# Patient Record
Sex: MALE | Race: OTHER | NOT HISPANIC OR LATINO | URBAN - METROPOLITAN AREA
[De-identification: names, ages, dates, MRNs, and addresses within clinical notes are randomized per-mention and may not be internally consistent; named-entity substitution may affect disease eponyms.]

---

## 2017-02-24 ENCOUNTER — EMERGENCY (EMERGENCY)
Facility: HOSPITAL | Age: 47
LOS: 0 days | Discharge: HOME | End: 2017-02-25
Attending: EMERGENCY MEDICINE
Payer: OTHER MISCELLANEOUS

## 2017-02-24 VITALS
OXYGEN SATURATION: 99 % | HEIGHT: 71 IN | DIASTOLIC BLOOD PRESSURE: 117 MMHG | TEMPERATURE: 98 F | RESPIRATION RATE: 18 BRPM | WEIGHT: 207.9 LBS | SYSTOLIC BLOOD PRESSURE: 185 MMHG | HEART RATE: 77 BPM
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
## 2017-02-24 DIAGNOSIS — H10.213 ACUTE TOXIC CONJUNCTIVITIS, BILATERAL: ICD-10-CM
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
## 2017-02-24 PROCEDURE — 99282 EMERGENCY DEPT VISIT SF MDM: CPT

## 2017-02-24 NOTE — ED ADULT TRIAGE NOTE - CHIEF COMPLAINT QUOTE
c/o b/l eye irritation r worse than l with redness and irritation since 1600, states possible foreign body, occurred while at work seen at clinic at work with eye flush administered, slight improvement

## 2017-02-25 VITALS
DIASTOLIC BLOOD PRESSURE: 85 MMHG | TEMPERATURE: 98 F | OXYGEN SATURATION: 100 % | SYSTOLIC BLOOD PRESSURE: 156 MMHG | HEART RATE: 82 BPM | RESPIRATION RATE: 16 BRPM

## 2017-02-25 NOTE — ED ADULT NURSE NOTE - OBJECTIVE STATEMENT
Patient reports bilateral redness, burning, and tearing since this afternoon. Possible foreign body into eyes at work. Bilateral conjunctiva red. No obvious foreign body noted.  Patient did eye rinse this afternoon. Patient reports bilateral redness, burning, and tearing since this afternoon. Possible foreign body into eyes at work. Bilateral conjunctiva red. No obvious foreign body noted.  Patient did eye rinse this afternoon around 4pm at time of incident. Denies Blurry vision. Visual acuity: Left 20/20 Right 20/20 both 20/20

## 2017-02-25 NOTE — ED PROVIDER NOTE - MEDICAL DECISION MAKING DETAILS
Each eye irrigated with 500mL of NS. Patient reports feeling well and is ready to go home. He has been provided with eye lubricant. Patient is to follow up with ophthalmologist tomorrow.

## 2017-02-25 NOTE — ED PROVIDER NOTE - OBJECTIVE STATEMENT
Pertinent PMH/PSH/FHx/SHx and Review of Systems contained within:  47 yo m with no PMH presents in ED c/o bilateral eye lacrimation and burning s/p scratching eyes after touching unknown liquid. Incident occurred at 4pm and patient reports irrigating eyes after. No aggravating or relieving factors, No fever/chills, No photophobia/eye pain/changes in vision, No ear pain/sore throat/dysphagia, No chest pain/palpitations, no SOB/cough/wheeze/stridor, No abdominal pain, No N/V/D, no dysuria/frequency/discharge, No neck/back pain, no rash, no changes in neurological status/function.

## 2017-02-25 NOTE — ED ADULT NURSE REASSESSMENT NOTE - NS ED NURSE REASSESS COMMENT FT1
Bilateral cl lens placed and both eye irrigated with 500ml of Normal saline. Patient tolerated well. Patient reports relief of burning after irriagtion.

## 2017-02-28 ENCOUNTER — APPOINTMENT (OUTPATIENT)
Dept: OPHTHALMOLOGY | Facility: CLINIC | Age: 47
End: 2017-02-28

## 2017-02-28 DIAGNOSIS — T26.62XA: ICD-10-CM

## 2017-02-28 PROBLEM — Z00.00 ENCOUNTER FOR PREVENTIVE HEALTH EXAMINATION: Status: ACTIVE | Noted: 2017-02-28

## 2017-03-28 ENCOUNTER — APPOINTMENT (OUTPATIENT)
Dept: OPHTHALMOLOGY | Facility: CLINIC | Age: 47
End: 2017-03-28

## 2017-07-23 NOTE — ED ADULT NURSE NOTE - CAS EDN DISCHARGE ASSESSMENT
Chronic renal failure  On hemodialysis T, Th, Sat  History of CVA (cerebrovascular accident)  Left sided weakness, dysphagia, PEG in place  Hypotension    Murmur    Osteoporosis
Alert and oriented to person, place and time

## 2020-09-30 ENCOUNTER — TRANSCRIPTION ENCOUNTER (OUTPATIENT)
Age: 50
End: 2020-09-30

## 2020-09-30 ENCOUNTER — INPATIENT (INPATIENT)
Facility: HOSPITAL | Age: 50
LOS: 0 days | Discharge: HOME | DRG: 494 | End: 2020-10-01
Attending: ORTHOPAEDIC SURGERY | Admitting: ORTHOPAEDIC SURGERY
Payer: COMMERCIAL

## 2020-09-30 VITALS
SYSTOLIC BLOOD PRESSURE: 178 MMHG | TEMPERATURE: 98 F | DIASTOLIC BLOOD PRESSURE: 90 MMHG | OXYGEN SATURATION: 98 % | WEIGHT: 203.93 LBS | HEART RATE: 74 BPM | RESPIRATION RATE: 18 BRPM | HEIGHT: 71 IN

## 2020-09-30 DIAGNOSIS — I10 ESSENTIAL (PRIMARY) HYPERTENSION: ICD-10-CM

## 2020-09-30 DIAGNOSIS — S93.05XA DISLOCATION OF LEFT ANKLE JOINT, INITIAL ENCOUNTER: ICD-10-CM

## 2020-09-30 DIAGNOSIS — R94.31 ABNORMAL ELECTROCARDIOGRAM [ECG] [EKG]: ICD-10-CM

## 2020-09-30 LAB
APTT BLD: 19.2 SEC — LOW (ref 27.5–35.5)
BASOPHILS # BLD AUTO: 0.04 K/UL — SIGNIFICANT CHANGE UP (ref 0–0.2)
BASOPHILS NFR BLD AUTO: 0.4 % — SIGNIFICANT CHANGE UP (ref 0–2)
BLD GP AB SCN SERPL QL: NEGATIVE — SIGNIFICANT CHANGE UP
CK SERPL-CCNC: 282 U/L — HIGH (ref 30–200)
EOSINOPHIL # BLD AUTO: 0.31 K/UL — SIGNIFICANT CHANGE UP (ref 0–0.5)
EOSINOPHIL NFR BLD AUTO: 3.4 % — SIGNIFICANT CHANGE UP (ref 0–6)
HCT VFR BLD CALC: 47.8 % — SIGNIFICANT CHANGE UP (ref 39–50)
HGB BLD-MCNC: 16.5 G/DL — SIGNIFICANT CHANGE UP (ref 13–17)
IMM GRANULOCYTES NFR BLD AUTO: 0.7 % — SIGNIFICANT CHANGE UP (ref 0–1.5)
INR BLD: 0.97 RATIO — SIGNIFICANT CHANGE UP (ref 0.88–1.16)
LYMPHOCYTES # BLD AUTO: 2.05 K/UL — SIGNIFICANT CHANGE UP (ref 1–3.3)
LYMPHOCYTES # BLD AUTO: 22.7 % — SIGNIFICANT CHANGE UP (ref 13–44)
MCHC RBC-ENTMCNC: 30.2 PG — SIGNIFICANT CHANGE UP (ref 27–34)
MCHC RBC-ENTMCNC: 34.5 GM/DL — SIGNIFICANT CHANGE UP (ref 32–36)
MCV RBC AUTO: 87.5 FL — SIGNIFICANT CHANGE UP (ref 80–100)
MONOCYTES # BLD AUTO: 0.54 K/UL — SIGNIFICANT CHANGE UP (ref 0–0.9)
MONOCYTES NFR BLD AUTO: 6 % — SIGNIFICANT CHANGE UP (ref 2–14)
NEUTROPHILS # BLD AUTO: 6.02 K/UL — SIGNIFICANT CHANGE UP (ref 1.8–7.4)
NEUTROPHILS NFR BLD AUTO: 66.8 % — SIGNIFICANT CHANGE UP (ref 43–77)
NRBC # BLD: 0 /100 WBCS — SIGNIFICANT CHANGE UP (ref 0–0)
PLATELET # BLD AUTO: 206 K/UL — SIGNIFICANT CHANGE UP (ref 150–400)
PROTHROM AB SERPL-ACNC: 11.7 SEC — SIGNIFICANT CHANGE UP (ref 10.6–13.6)
RBC # BLD: 5.46 M/UL — SIGNIFICANT CHANGE UP (ref 4.2–5.8)
RBC # FLD: 12.1 % — SIGNIFICANT CHANGE UP (ref 10.3–14.5)
RH IG SCN BLD-IMP: POSITIVE — SIGNIFICANT CHANGE UP
SARS-COV-2 RNA SPEC QL NAA+PROBE: SIGNIFICANT CHANGE UP
WBC # BLD: 9.02 K/UL — SIGNIFICANT CHANGE UP (ref 3.8–10.5)
WBC # FLD AUTO: 9.02 K/UL — SIGNIFICANT CHANGE UP (ref 3.8–10.5)

## 2020-09-30 PROCEDURE — 73590 X-RAY EXAM OF LOWER LEG: CPT | Mod: 26,LT

## 2020-09-30 PROCEDURE — 93010 ELECTROCARDIOGRAM REPORT: CPT | Mod: 59

## 2020-09-30 PROCEDURE — 73610 X-RAY EXAM OF ANKLE: CPT | Mod: 26,LT

## 2020-09-30 PROCEDURE — 71045 X-RAY EXAM CHEST 1 VIEW: CPT | Mod: 26

## 2020-09-30 PROCEDURE — 27840 TREAT ANKLE DISLOCATION: CPT | Mod: 54

## 2020-09-30 PROCEDURE — 73610 X-RAY EXAM OF ANKLE: CPT | Mod: 26,LT,77

## 2020-09-30 PROCEDURE — 99291 CRITICAL CARE FIRST HOUR: CPT | Mod: 57

## 2020-09-30 RX ORDER — OXYCODONE HYDROCHLORIDE 5 MG/1
5 TABLET ORAL EVERY 4 HOURS
Refills: 0 | Status: DISCONTINUED | OUTPATIENT
Start: 2020-09-30 | End: 2020-10-01

## 2020-09-30 RX ORDER — MAGNESIUM HYDROXIDE 400 MG/1
30 TABLET, CHEWABLE ORAL DAILY
Refills: 0 | Status: DISCONTINUED | OUTPATIENT
Start: 2020-09-30 | End: 2020-10-01

## 2020-09-30 RX ORDER — ONDANSETRON 8 MG/1
4 TABLET, FILM COATED ORAL ONCE
Refills: 0 | Status: COMPLETED | OUTPATIENT
Start: 2020-09-30 | End: 2020-09-30

## 2020-09-30 RX ORDER — METOCLOPRAMIDE HCL 10 MG
5 TABLET ORAL EVERY 6 HOURS
Refills: 0 | Status: DISCONTINUED | OUTPATIENT
Start: 2020-09-30 | End: 2020-10-01

## 2020-09-30 RX ORDER — SODIUM CHLORIDE 9 MG/ML
1000 INJECTION, SOLUTION INTRAVENOUS
Refills: 0 | Status: DISCONTINUED | OUTPATIENT
Start: 2020-09-30 | End: 2020-10-01

## 2020-09-30 RX ORDER — ACETAMINOPHEN 500 MG
975 TABLET ORAL EVERY 8 HOURS
Refills: 0 | Status: DISCONTINUED | OUTPATIENT
Start: 2020-09-30 | End: 2020-10-01

## 2020-09-30 RX ORDER — MORPHINE SULFATE 50 MG/1
2 CAPSULE, EXTENDED RELEASE ORAL
Refills: 0 | Status: DISCONTINUED | OUTPATIENT
Start: 2020-09-30 | End: 2020-10-01

## 2020-09-30 RX ORDER — SENNA PLUS 8.6 MG/1
2 TABLET ORAL AT BEDTIME
Refills: 0 | Status: DISCONTINUED | OUTPATIENT
Start: 2020-09-30 | End: 2020-10-01

## 2020-09-30 RX ORDER — MORPHINE SULFATE 50 MG/1
4 CAPSULE, EXTENDED RELEASE ORAL ONCE
Refills: 0 | Status: DISCONTINUED | OUTPATIENT
Start: 2020-09-30 | End: 2020-09-30

## 2020-09-30 RX ORDER — OXYCODONE HYDROCHLORIDE 5 MG/1
10 TABLET ORAL EVERY 4 HOURS
Refills: 0 | Status: DISCONTINUED | OUTPATIENT
Start: 2020-09-30 | End: 2020-10-01

## 2020-09-30 RX ADMIN — MORPHINE SULFATE 4 MILLIGRAM(S): 50 CAPSULE, EXTENDED RELEASE ORAL at 20:29

## 2020-09-30 RX ADMIN — MORPHINE SULFATE 4 MILLIGRAM(S): 50 CAPSULE, EXTENDED RELEASE ORAL at 21:30

## 2020-09-30 RX ADMIN — Medication 975 MILLIGRAM(S): at 23:32

## 2020-09-30 NOTE — ED ADULT NURSE REASSESSMENT NOTE - NS ED NURSE REASSESS COMMENT FT1
Pt reports pain relief & currently rates it 3/10, denies wanting further pain intervention at this time. Pt resting comfortably without complaints & does not appear to be in any acute distress at this time with VSS. Will continue to reassess.

## 2020-09-30 NOTE — ED ADULT NURSE NOTE - OBJECTIVE STATEMENT
Patient is a 50y male presenting to the ED via EMS with c/o left ankle injury/pain. Patient A&Ox4. Reports injuring left ankle while playing soccer. States he was dribbling the ball, the ball "gave out" causing his left ankle to praveena. Reports feeling sharp pain right away in the left ankle with radiation to the left lower leg. Upon arrival to [facility] ED, patient displays a left ankle deformity with associated swelling and sharp, 8/10 pain in the left ankle and left lower leg. No palpable DP or PT pulses present in the left lower extremity. Gross sensation intact. Patient is able to move toes of the left foot. Left ankle and left  to palpation. Denies any pain in the left hip, left knee or proximal tibia. PMH HTN.

## 2020-09-30 NOTE — CONSULT NOTE ADULT - SUBJECTIVE AND OBJECTIVE BOX
Patient is a 50y old  Male who presents with a chief complaint of left an kle pain     HPI:     51 y/o male with a h/o HTN, presenting with left ankle pain/swelling s/p injury, which started PTA. Patient states he was playing soccer when he stepped on the ball and everted his ankle. Acute onset, radiating to left lower leg, sharp pain, associated with left ankle deformity. Patient was BIBEMS, no meds given. Denies any head trauma, knee pain, hip pain, or other injuries.  Denies SOB chest pain Nausea or vomiting.    MEDICATIONS  (STANDING):  ondansetron Injectable 4 milliGRAM(s) IV Push once    MEDICATIONS  (PRN):      Allergies    No Known Allergies    Intolerances      PAST MEDICAL HISTORY:  Essential hypertension      PAST SURGICAL HISTORY:  No significant past surgical history        Diet:  (   ) Regular   (  x ) Low Sodium   (   ) Low Cholesterol   (   ) Diabetic   (   ) Other    FAMILY HISTORY:  N/C    SOCIAL HISTORY:    Substance Use: (x  ) never used  (  ) other:  Tobacco Usage:  ( x ) never smoked   (   ) former smoker   (   ) current smoker  (   ) pack years  (   ) last cigarette date  Alcohol Usage: none  Advanced Directives: (   ) None    (   ) DNR    (   ) DNI    (   ) Health Care Proxy:     REVIEW OF SYSTEM:  CONSTITUTIONAL: No fever, No change in weight, No fatigue  HEAD: No headache, No dizziness, No recent trauma  EYES: No eye pain, No visual disturbances, No discharge  ENT:  No difficulty hearing, No tinnitus, No vertigo, No sinus pain, No throat pain  NECK: No pain, No stiffness  BREASTS: No pain, No masses, No nipple discharge  RESPIRATORY: No cough, No wheezing, No chills, No hemoptysis, No shortness of breath at rest or exertional shortness of breath  CARDIOVASCULAR: No chest pain, No palpitations, No dizziness, No CHF, No arrhythmia, No cardiomegaly, No leg swelling  GASTROINTESTINAL: No abdominal, No epigastric pain. No nausea, No vomiting, No hematemesis, No diarrhea, No constipation. No melena, No hematochezia. No GERD  GENITOURINARY: No dysuria, No frequency, No hematuria, No incontinence, No nocturia, No hesitancy,  SKIN: No itching, No burning, No rashes, No lesions   LYMPH NODES: No history of enlarged glands  ENDOCRINE: No heat or cold intolerance, No hair loss. No osteoporosis, No thyroid disease  MUSCULOSKELETAL: No joint pain or swelling, No muscle, back, or extremity pain  (+) painful swollen left ankle  PSYCHIATRIC: No depression, No anxiety, No mood swings, No difficulty sleeping  HEME/LYMPH: No easy bruising, No anticoagulants, No bleeding disorder, No bleeding gums  ALLERGY AND IMMUNOLOGIC: No hives, No eczema  NEUROLOGICAL: No memory loss, No loss of strength, No numbness, No tremors    VITALS:  Vital Signs Last 24 Hrs  T(C): 36.6 (30 Sep 2020 19:50), Max: 36.6 (30 Sep 2020 19:50)  T(F): 97.8 (30 Sep 2020 19:50), Max: 97.8 (30 Sep 2020 19:50)  HR: 74 (30 Sep 2020 19:50) (74 - 74)  BP: 178/90 (30 Sep 2020 19:50) (178/90 - 178/90)  BP(mean): --  RR: 18 (30 Sep 2020 19:50) (18 - 18)  SpO2: 98% (30 Sep 2020 19:50) (98% - 98%)  I&O's Summary      PHYSICAL EXAM:  GENERAL: NAD, well nourished and conversant  HEAD:  Atraumatic  EYES: EOM, PERRLA, conjunctiva pink and sclera white  ENT: No tonsillar erythema, exudates, or enlargement, moist mucous membranes, good dentition, no lesions  NECK: Supple, No JVD, normal thyroid, carotids with normal upstrokes and no bruits  CHEST/LUNG: Clear to auscultation bilaterally, No rales, rhonchi, wheezing, or rubs  HEART: Regular rate and rhythm, No murmurs, rubs, or gallops  ABDOMEN: Soft, nondistended, no masses, guarding, tenderness or rebound, bowel sounds present  EXTREMITIES:  2+ Peripheral Pulses, No clubbing, cyanosis, or edema. No arthritis of shoulders, elbows, hands, hips, knees, ankles, or feet. No DJD C spine, T spine, or L/S spine  (+) swollen left ankle   LYMPH: No lymphadenopathy noted  SKIN: No rashes or lesions  NERVOUS SYSTEM:  Alert & Oriented X3, normal cognitive function. Motor Strength 5/5 right upper and right lower.  5/5 left upper and left lower extremities, DTRs 2+ intact and symmetric    LABS:      nsr no acute changes inverted T waves inferolateral leads      Labs unremarkable          CAPILLARY BLOOD GLUCOSE          RADIOLOGY & ADDITIONAL TESTS:    Consultant(s):    Care Discussed with Consultants/Other Providers [ ] YES  [ ] NO

## 2020-09-30 NOTE — ED PROVIDER NOTE - PROGRESS NOTE DETAILS
Patient seen at bedside immediately with EMS, noted to have obvious L ankle dislocation (closed); diminished DP pulse and sensation to L foot noted.  L foot also noted to be slightly cool relative to R.  Emergent reduction performed (emergent consent implied) using traction/countertraction (see procedure note) without complication, patient tolerated well, noted to have good return of DP pulse and sensation.  Foot warm to touch almost immediately after procedure.  Ortho contacted, recommend imaging, will be at bedside shortly.  --JOANN

## 2020-09-30 NOTE — CONSULT NOTE ADULT - ASSESSMENT
49 y/o male with a h/o HTN, presenting with left ankle pain/swelling s/p injury, which started PTA. Patient states he was playing soccer when he stepped on the ball and everted his ankle. Acute onset, radiating to left lower leg, sharp pain, associated with left ankle deformity. Patient was BIBEMS, no meds given. Denies any head trauma, knee pain, hip pain, or other injuries.  Denies SOB chest pain Nausea or vomiting.

## 2020-09-30 NOTE — ED ADULT NURSE NOTE - NSIMPLEMENTINTERV_GEN_ALL_ED
Implemented All Universal Safety Interventions:  [place] to call system. Call bell, personal items and telephone within reach. Instruct patient to call for assistance. Room bathroom lighting operational. Non-slip footwear when patient is off stretcher. Physically safe environment: no spills, clutter or unnecessary equipment. Stretcher in lowest position, wheels locked, appropriate side rails in place.

## 2020-09-30 NOTE — ED PROVIDER NOTE - PHYSICAL EXAMINATION
Musc:   Left ankle: no palpable DP/PT pulses. Foot slightly cool. Sensation intact. Pt able to moves toes. Obvious lateral deviated deformity of ankle with moderate-severe swelling. Diffuse tenderness to palpation.   Mild tenderness to lateral mid lower leg, but no tenderness over left fibular head. Normal ROM of knee and hip, no tenderness or swelling.

## 2020-09-30 NOTE — ED PROVIDER NOTE - CARE PLAN
Principal Discharge DX:	Ankle dislocation, left, initial encounter   Principal Discharge DX:	Ankle dislocation, left, initial encounter  Secondary Diagnosis:	Left trimalleolar fracture, closed, initial encounter

## 2020-09-30 NOTE — ED PROVIDER NOTE - OBJECTIVE STATEMENT
49 y/o male with a h/o HTN, presenting with left ankle pain/swelling 51 y/o male with a h/o HTN, presenting with left ankle pain/swelling s/p injury, which started PTA. Patient states he was playing soccer when he stepped on the ball and everted his ankle. Acute onset, radiating to left lower leg, sharp pain, associated with left ankle deformity. Patient was BIBEMS, no meds given. Denies any head trauma, knee pain, hip pain, or other injuries.

## 2020-09-30 NOTE — CONSULT NOTE ADULT - PROBLEM SELECTOR RECOMMENDATION 3
Include Location In Plan?: No Detail Level: Simple Patient with no evidence of cardiac issues  Check troponin level .  Consider Echocardiogram if c/o chest pain

## 2020-09-30 NOTE — ED PROVIDER NOTE - CLINICAL SUMMARY MEDICAL DECISION MAKING FREE TEXT BOX
Closed L ankle dislocation with NV compromise, emergently reduced immediately upon arrival to patient care area, noted to have return of full NV exam.  Ortho c/s, preop labs, imaging, pain Rx, likely admit.  --BMM Closed L ankle dislocation with NV compromise, emergently reduced immediately upon arrival to patient care area, noted to have return of full NV exam.  Ortho c/s, preop labs, imaging, pain Rx, likely admit.  --BMM    Reduced ankle dislocation emergently due to NV compromise. Ortho consulted, splint placed by ortho. Pain well-controlled in ED.   Admitted to ortho surgery.

## 2020-10-01 ENCOUNTER — TRANSCRIPTION ENCOUNTER (OUTPATIENT)
Age: 50
End: 2020-10-01

## 2020-10-01 VITALS
OXYGEN SATURATION: 96 % | HEART RATE: 94 BPM | TEMPERATURE: 99 F | SYSTOLIC BLOOD PRESSURE: 143 MMHG | RESPIRATION RATE: 19 BRPM | DIASTOLIC BLOOD PRESSURE: 89 MMHG

## 2020-10-01 DIAGNOSIS — S82.852A DISPLACED TRIMALLEOLAR FRACTURE OF LEFT LOWER LEG, INITIAL ENCOUNTER FOR CLOSED FRACTURE: ICD-10-CM

## 2020-10-01 PROBLEM — I10 ESSENTIAL (PRIMARY) HYPERTENSION: Chronic | Status: ACTIVE | Noted: 2017-02-25

## 2020-10-01 LAB
ANION GAP SERPL CALC-SCNC: 14 MMOL/L — SIGNIFICANT CHANGE UP (ref 5–17)
APTT BLD: 30.3 SEC — SIGNIFICANT CHANGE UP (ref 27.5–35.5)
BUN SERPL-MCNC: 16 MG/DL — SIGNIFICANT CHANGE UP (ref 7–23)
CALCIUM SERPL-MCNC: 10.6 MG/DL — HIGH (ref 8.4–10.5)
CHLORIDE SERPL-SCNC: 98 MMOL/L — SIGNIFICANT CHANGE UP (ref 96–108)
CO2 SERPL-SCNC: 25 MMOL/L — SIGNIFICANT CHANGE UP (ref 22–31)
CREAT SERPL-MCNC: 1.19 MG/DL — SIGNIFICANT CHANGE UP (ref 0.5–1.3)
GLUCOSE SERPL-MCNC: 125 MG/DL — HIGH (ref 70–99)
HCT VFR BLD CALC: 45.2 % — SIGNIFICANT CHANGE UP (ref 39–50)
HGB BLD-MCNC: 16 G/DL — SIGNIFICANT CHANGE UP (ref 13–17)
MCHC RBC-ENTMCNC: 30.6 PG — SIGNIFICANT CHANGE UP (ref 27–34)
MCHC RBC-ENTMCNC: 35.4 GM/DL — SIGNIFICANT CHANGE UP (ref 32–36)
MCV RBC AUTO: 86.4 FL — SIGNIFICANT CHANGE UP (ref 80–100)
NRBC # BLD: 0 /100 WBCS — SIGNIFICANT CHANGE UP (ref 0–0)
PLATELET # BLD AUTO: 287 K/UL — SIGNIFICANT CHANGE UP (ref 150–400)
POTASSIUM SERPL-MCNC: 3.4 MMOL/L — LOW (ref 3.5–5.3)
POTASSIUM SERPL-SCNC: 3.4 MMOL/L — LOW (ref 3.5–5.3)
RBC # BLD: 5.23 M/UL — SIGNIFICANT CHANGE UP (ref 4.2–5.8)
RBC # FLD: 12.1 % — SIGNIFICANT CHANGE UP (ref 10.3–14.5)
SARS-COV-2 IGG SERPL QL IA: NEGATIVE — SIGNIFICANT CHANGE UP
SARS-COV-2 IGM SERPL IA-ACNC: <0.1 INDEX — SIGNIFICANT CHANGE UP
SODIUM SERPL-SCNC: 137 MMOL/L — SIGNIFICANT CHANGE UP (ref 135–145)
TROPONIN T, HIGH SENSITIVITY RESULT: 13 NG/L — SIGNIFICANT CHANGE UP (ref 0–51)
WBC # BLD: 9.28 K/UL — SIGNIFICANT CHANGE UP (ref 3.8–10.5)
WBC # FLD AUTO: 9.28 K/UL — SIGNIFICANT CHANGE UP (ref 3.8–10.5)

## 2020-10-01 PROCEDURE — 73590 X-RAY EXAM OF LOWER LEG: CPT

## 2020-10-01 PROCEDURE — 97161 PT EVAL LOW COMPLEX 20 MIN: CPT

## 2020-10-01 PROCEDURE — 93005 ELECTROCARDIOGRAM TRACING: CPT | Mod: XU

## 2020-10-01 PROCEDURE — 85730 THROMBOPLASTIN TIME PARTIAL: CPT

## 2020-10-01 PROCEDURE — 71045 X-RAY EXAM CHEST 1 VIEW: CPT

## 2020-10-01 PROCEDURE — 96374 THER/PROPH/DIAG INJ IV PUSH: CPT | Mod: XU

## 2020-10-01 PROCEDURE — 82550 ASSAY OF CK (CPK): CPT

## 2020-10-01 PROCEDURE — 80053 COMPREHEN METABOLIC PANEL: CPT

## 2020-10-01 PROCEDURE — 86900 BLOOD TYPING SEROLOGIC ABO: CPT

## 2020-10-01 PROCEDURE — 73610 X-RAY EXAM OF ANKLE: CPT

## 2020-10-01 PROCEDURE — 99285 EMERGENCY DEPT VISIT HI MDM: CPT | Mod: 25

## 2020-10-01 PROCEDURE — 85027 COMPLETE CBC AUTOMATED: CPT

## 2020-10-01 PROCEDURE — 86901 BLOOD TYPING SEROLOGIC RH(D): CPT

## 2020-10-01 PROCEDURE — 80048 BASIC METABOLIC PNL TOTAL CA: CPT

## 2020-10-01 PROCEDURE — 27840 TREAT ANKLE DISLOCATION: CPT | Mod: LT

## 2020-10-01 PROCEDURE — 86850 RBC ANTIBODY SCREEN: CPT

## 2020-10-01 PROCEDURE — 85610 PROTHROMBIN TIME: CPT

## 2020-10-01 PROCEDURE — C1713: CPT

## 2020-10-01 PROCEDURE — 76000 FLUOROSCOPY <1 HR PHYS/QHP: CPT

## 2020-10-01 PROCEDURE — 84484 ASSAY OF TROPONIN QUANT: CPT

## 2020-10-01 PROCEDURE — 85025 COMPLETE CBC W/AUTO DIFF WBC: CPT

## 2020-10-01 PROCEDURE — 86769 SARS-COV-2 COVID-19 ANTIBODY: CPT

## 2020-10-01 PROCEDURE — U0003: CPT

## 2020-10-01 RX ORDER — OXYCODONE HYDROCHLORIDE 5 MG/1
10 TABLET ORAL EVERY 4 HOURS
Refills: 0 | Status: DISCONTINUED | OUTPATIENT
Start: 2020-10-01 | End: 2020-10-01

## 2020-10-01 RX ORDER — OXYCODONE HYDROCHLORIDE 5 MG/1
5 TABLET ORAL EVERY 4 HOURS
Refills: 0 | Status: DISCONTINUED | OUTPATIENT
Start: 2020-10-01 | End: 2020-10-01

## 2020-10-01 RX ORDER — MAGNESIUM HYDROXIDE 400 MG/1
30 TABLET, CHEWABLE ORAL DAILY
Refills: 0 | Status: DISCONTINUED | OUTPATIENT
Start: 2020-10-01 | End: 2020-10-01

## 2020-10-01 RX ORDER — ACETAMINOPHEN 500 MG
650 TABLET ORAL EVERY 6 HOURS
Refills: 0 | Status: DISCONTINUED | OUTPATIENT
Start: 2020-10-01 | End: 2020-10-01

## 2020-10-01 RX ORDER — KETOROLAC TROMETHAMINE 30 MG/ML
30 SYRINGE (ML) INJECTION ONCE
Refills: 0 | Status: DISCONTINUED | OUTPATIENT
Start: 2020-10-01 | End: 2020-10-01

## 2020-10-01 RX ORDER — CEFAZOLIN SODIUM 1 G
2000 VIAL (EA) INJECTION EVERY 8 HOURS
Refills: 0 | Status: COMPLETED | OUTPATIENT
Start: 2020-10-01 | End: 2020-10-01

## 2020-10-01 RX ORDER — LISINOPRIL 2.5 MG/1
40 TABLET ORAL DAILY
Refills: 0 | Status: DISCONTINUED | OUTPATIENT
Start: 2020-10-01 | End: 2020-10-01

## 2020-10-01 RX ORDER — ACETAMINOPHEN 500 MG
2 TABLET ORAL
Qty: 0 | Refills: 0 | DISCHARGE
Start: 2020-10-01

## 2020-10-01 RX ORDER — AMLODIPINE BESYLATE 2.5 MG/1
10 TABLET ORAL DAILY
Refills: 0 | Status: DISCONTINUED | OUTPATIENT
Start: 2020-10-01 | End: 2020-10-01

## 2020-10-01 RX ORDER — AMLODIPINE BESYLATE AND BENAZEPRIL HYDROCHLORIDE 10; 20 MG/1; MG/1
1 CAPSULE ORAL
Qty: 0 | Refills: 0 | DISCHARGE

## 2020-10-01 RX ORDER — HYDROMORPHONE HYDROCHLORIDE 2 MG/ML
0.25 INJECTION INTRAMUSCULAR; INTRAVENOUS; SUBCUTANEOUS
Refills: 0 | Status: DISCONTINUED | OUTPATIENT
Start: 2020-10-01 | End: 2020-10-01

## 2020-10-01 RX ORDER — ACETAMINOPHEN 500 MG
1000 TABLET ORAL ONCE
Refills: 0 | Status: COMPLETED | OUTPATIENT
Start: 2020-10-01 | End: 2020-10-01

## 2020-10-01 RX ORDER — DIPHENHYDRAMINE HCL 50 MG
25 CAPSULE ORAL AT BEDTIME
Refills: 0 | Status: DISCONTINUED | OUTPATIENT
Start: 2020-10-01 | End: 2020-10-01

## 2020-10-01 RX ORDER — ASPIRIN/CALCIUM CARB/MAGNESIUM 324 MG
1 TABLET ORAL
Qty: 80 | Refills: 0
Start: 2020-10-01 | End: 2020-11-09

## 2020-10-01 RX ORDER — HYDROMORPHONE HYDROCHLORIDE 2 MG/ML
0.5 INJECTION INTRAMUSCULAR; INTRAVENOUS; SUBCUTANEOUS
Refills: 0 | Status: DISCONTINUED | OUTPATIENT
Start: 2020-10-01 | End: 2020-10-01

## 2020-10-01 RX ORDER — LABETALOL HCL 100 MG
10 TABLET ORAL ONCE
Refills: 0 | Status: COMPLETED | OUTPATIENT
Start: 2020-10-01 | End: 2020-10-01

## 2020-10-01 RX ORDER — ASPIRIN/CALCIUM CARB/MAGNESIUM 324 MG
325 TABLET ORAL
Refills: 0 | Status: DISCONTINUED | OUTPATIENT
Start: 2020-10-02 | End: 2020-10-01

## 2020-10-01 RX ORDER — OXYCODONE HYDROCHLORIDE 5 MG/1
1 TABLET ORAL
Qty: 42 | Refills: 0
Start: 2020-10-01 | End: 2020-10-07

## 2020-10-01 RX ORDER — ONDANSETRON 8 MG/1
4 TABLET, FILM COATED ORAL EVERY 6 HOURS
Refills: 0 | Status: DISCONTINUED | OUTPATIENT
Start: 2020-10-01 | End: 2020-10-01

## 2020-10-01 RX ADMIN — HYDROMORPHONE HYDROCHLORIDE 0.25 MILLIGRAM(S): 2 INJECTION INTRAMUSCULAR; INTRAVENOUS; SUBCUTANEOUS at 10:24

## 2020-10-01 RX ADMIN — Medication 975 MILLIGRAM(S): at 05:57

## 2020-10-01 RX ADMIN — OXYCODONE HYDROCHLORIDE 5 MILLIGRAM(S): 5 TABLET ORAL at 05:56

## 2020-10-01 RX ADMIN — Medication 100 MILLIGRAM(S): at 13:23

## 2020-10-01 RX ADMIN — HYDROMORPHONE HYDROCHLORIDE 0.25 MILLIGRAM(S): 2 INJECTION INTRAMUSCULAR; INTRAVENOUS; SUBCUTANEOUS at 10:45

## 2020-10-01 RX ADMIN — Medication 975 MILLIGRAM(S): at 05:27

## 2020-10-01 RX ADMIN — Medication 400 MILLIGRAM(S): at 10:30

## 2020-10-01 RX ADMIN — Medication 1000 MILLIGRAM(S): at 11:00

## 2020-10-01 RX ADMIN — Medication 10 MILLIGRAM(S): at 09:51

## 2020-10-01 RX ADMIN — OXYCODONE HYDROCHLORIDE 10 MILLIGRAM(S): 5 TABLET ORAL at 15:30

## 2020-10-01 RX ADMIN — SODIUM CHLORIDE 70 MILLILITER(S): 9 INJECTION, SOLUTION INTRAVENOUS at 01:21

## 2020-10-01 RX ADMIN — Medication 100 MILLIGRAM(S): at 17:19

## 2020-10-01 RX ADMIN — OXYCODONE HYDROCHLORIDE 10 MILLIGRAM(S): 5 TABLET ORAL at 14:59

## 2020-10-01 RX ADMIN — AMLODIPINE BESYLATE 10 MILLIGRAM(S): 2.5 TABLET ORAL at 06:45

## 2020-10-01 RX ADMIN — OXYCODONE HYDROCHLORIDE 5 MILLIGRAM(S): 5 TABLET ORAL at 05:26

## 2020-10-01 NOTE — PROGRESS NOTE ADULT - SUBJECTIVE AND OBJECTIVE BOX
Patient seen and examined. Pain controlled. hypertensive overnight. ps denies cp/sob/fevers/chills. pain at ankle only with movement.                            16.5   9.02  )-----------( 206      ( 30 Sep 2020 21:31 )             47.8     30 Sep 2020 21:31    134    |  94     |  17     ----------------------------<  126    4.3     |  22     |  1.18     Ca    10.8       30 Sep 2020 21:31    TPro  8.4    /  Alb  4.7    /  TBili  0.4    /  DBili  x      /  AST  42     /  ALT  32     /  AlkPhos  109    30 Sep 2020 21:31    PT/INR - ( 30 Sep 2020 21:31 )   PT: 11.7 sec;   INR: 0.97 ratio         PTT - ( 30 Sep 2020 21:31 )  PTT:19.2 sec  Vital Signs Last 24 Hrs  T(C): 36.7 (10-01-20 @ 04:34), Max: 37.7 (10-01-20 @ 00:00)  T(F): 98 (10-01-20 @ 04:34), Max: 99.8 (10-01-20 @ 00:00)  HR: 71 (10-01-20 @ 04:34) (71 - 80)  BP: 156/98 (10-01-20 @ 04:34) (152/65 - 178/101)  BP(mean): --  RR: 18 (10-01-20 @ 04:34) (18 - 20)  SpO2: 99% (10-01-20 @ 04:34) (97% - 99%)    Physical Exam  Gen: NAD  L LE:   foot elevated with ice  trilam splint c/d/i  motor grossly intact to toes  silt toes  no pain with passive stretch toes  cap refill <2sec  Compartments soft    A/P:  50yMale w/ L ankle fracture    plan for OR today 10/1/20  npo except meds  iv fluids while npo  hold chemical AC  NWB LLE in splint  keep splint c/d/i  elevation w/ ice  Pain control  Medical comanagement appreciated  Incentive spirometry

## 2020-10-01 NOTE — DISCHARGE NOTE PROVIDER - NSDCFUADDINST_GEN_ALL_CORE_FT
Schedule followup appointment with Dr. Bronson's office two weeks after surgery for routine care/dressing change. Keep dressing clean and dry. Continue taking aspirin 325 mg twice daily for 6 weeks postoperatively for blood clot prevention.

## 2020-10-01 NOTE — DISCHARGE NOTE PROVIDER - HOSPITAL COURSE
Patient presented to [facility] ED on 9/30 s/p fall and found to have sustained a L astrid ankle fracture. Patient cleared for a L ankle ORIF on 10/1 with Dr. Bronson and tolerated procedure well. Patient seen postoperatively by physical therapy who recommended discharge home with outpatient physical therapy. Rest of hospital stay unremarkable and patient discharged when medically cleared.

## 2020-10-01 NOTE — H&P ADULT - HISTORY OF PRESENT ILLNESS
50y Male presents to [facility] ED s/p [facility] fall playing soccer c/o severe left ankle pain and inability to ambulate.  Patient denies headstrike or LOC. Cold pulseless foot on arrival, preliminary reduction by ER staff. Patient denies numbness/tingling/burning in the LLE. No other bone/joint complaints. Active at baseline. Nonsmoker. No DM.

## 2020-10-01 NOTE — H&P ADULT - NSHPPHYSICALEXAM_GEN_ALL_CORE
T(C): 37.7 (10-01-20 @ 00:00), Max: 37.7 (10-01-20 @ 00:00)  HR: 80 (10-01-20 @ 00:00) (72 - 80)  BP: 153/91 (10-01-20 @ 00:00) (152/65 - 178/101)  RR: 18 (10-01-20 @ 00:00) (18 - 20)  SpO2: 98% (10-01-20 @ 00:00) (97% - 98%)  Wt(kg): --    PE   LLE:  Skin intact  mild to moderate swelling, skin wrinkles medial and laterally  TTP about medial and lateral ankle.   no deformity  Compartments soft  no knee or hip ttp  ROM limited 2/2 pain   Motor intact GS/TA/FHL/EHL  SILT L2-S1  DP/PT pulses 2+  Foot warm and well perfused.    RLE/BUE:   No bony TTP; Good ROM w/o pain; Exam Unremarkable    Imaging:  XR demonstrating left ankle bimalleolar ankle fracture.     Closed reduction and placement of well padded trilam splint. Tolerated well. Remained NV intact with 2+ DP pulse.

## 2020-10-01 NOTE — DISCHARGE NOTE NURSING/CASE MANAGEMENT/SOCIAL WORK - PATIENT PORTAL LINK FT
You can access the FollowMyHealth Patient Portal offered by [facility] by registering at the following website: [email or web address]. By joining [email or web address] FollowMyHealth portal, you will also be able to view your health information using other applications (apps) compatible with our system.

## 2020-10-01 NOTE — DISCHARGE NOTE PROVIDER - CARE PROVIDER_API CALL
Rob Bronson)  Orthopaedic Surgery  [address] [place] [address], [address]  [place] [address]  Phone: [phone]  Fax: [phone]  Follow Up Time:

## 2020-10-01 NOTE — PRE-OP CHECKLIST - SELECT TESTS ORDERED
Patient is being approved for medication for Tramadol 50mg, Antonino already  Rx  Type and Screen/CBC/PT/PTT/EKG/BMP/INR

## 2020-10-01 NOTE — PATIENT PROFILE ADULT - NSPROSPIRITUALVALUESFT_GEN_A_NUR
Quality 110: Preventive Care And Screening: Influenza Immunization: Influenza Immunization not Administered for Documented Reasons. Quality 111:Pneumonia Vaccination Status For Older Adults: Pneumococcal Vaccination Previously Received Detail Level: Detailed no

## 2020-10-01 NOTE — DISCHARGE NOTE PROVIDER - NSDCCPCAREPLAN_GEN_ALL_CORE_FT
PRINCIPAL DISCHARGE DIAGNOSIS  Diagnosis: Ankle dislocation, left, initial encounter  Assessment and Plan of Treatment:       SECONDARY DISCHARGE DIAGNOSES  Diagnosis: Fracture of ankle, bimalleolar, left, closed  Assessment and Plan of Treatment:

## 2020-10-01 NOTE — DISCHARGE NOTE PROVIDER - NSDCMRMEDTOKEN_GEN_ALL_CORE_FT
amlodipine-benazepril 10 mg-40 mg oral capsule: 1 cap(s) orally once a day   acetaminophen 325 mg oral tablet: 2 tab(s) orally every 6 hours, As needed, Temp greater or equal to 38C (100.4F), Mild Pain (1 - 3)  amlodipine-benazepril 10 mg-40 mg oral capsule: 1 cap(s) orally once a day  aspirin 325 mg oral delayed release tablet: 1 tab(s) orally 2 times a day  oxyCODONE 5 mg oral tablet: 1-2 tab(s) orally every 4 hours, As needed, Moderate-Severe Pain (4 - 10) MDD:10

## 2020-10-01 NOTE — PROGRESS NOTE ADULT - SUBJECTIVE AND OBJECTIVE BOX
51 y/o male with a h/o HTN, presenting with left ankle pain/swelling s/p injury, which started PTA. Patient states he was playing soccer when he stepped on the ball and everted his ankle. Acute onset, radiating to left lower leg, sharp pain, associated with left ankle deformity. Patient was BIBEMS, no meds given. Denies any head trauma, knee pain, hip pain, or other injuries.  Denies SOB chest pain Nausea or vomiting.    MEDICATIONS  (STANDING):  ondansetron Injectable 4 milliGRAM(s) IV Push once    MEDICATIONS  (PRN):    VITALS:  Vital Signs Last 24 Hrs  T(C): 36.6 (30 Sep 2020 19:50), Max: 36.6 (30 Sep 2020 19:50)  T(F): 97.8 (30 Sep 2020 19:50), Max: 97.8 (30 Sep 2020 19:50)  HR: 74 (30 Sep 2020 19:50) (74 - 74)  BP: 178/90 (30 Sep 2020 19:50) (178/90 - 178/90)  BP(mean): --  RR: 18 (30 Sep 2020 19:50) (18 - 18)  SpO2: 98% (30 Sep 2020 19:50) (98% - 98%)  I&O's Summary      PHYSICAL EXAM:  GENERAL: NAD, well nourished and conversant  HEAD:  Atraumatic  EYES: EOM, PERRLA, conjunctiva pink and sclera white  ENT: No tonsillar erythema, exudates, or enlargement, moist mucous membranes, good dentition, no lesions  NECK: Supple, No JVD, normal thyroid, carotids with normal upstrokes and no bruits  CHEST/LUNG: Clear to auscultation bilaterally, No rales, rhonchi, wheezing, or rubs  HEART: Regular rate and rhythm, No murmurs, rubs, or gallops  ABDOMEN: Soft, nondistended, no masses, guarding, tenderness or rebound, bowel sounds present  EXTREMITIES:  2+ Peripheral Pulses, No clubbing, cyanosis, or edema. No arthritis of shoulders, elbows, hands, hips, knees, ankles, or feet. No DJD C spine, T spine, or L/S spine  (+) swollen left ankle   LYMPH: No lymphadenopathy noted  SKIN: No rashes or lesions  NERVOUS SYSTEM:  Alert & Oriented X3, normal cognitive function. Motor Strength 5/5 right upper and right lower.  5/5 left upper and left lower extremities, DTRs 2+ intact and symmetric    LABS:      nsr no acute changes inverted T waves inferolateral leads      Labs unremarkable          CAPILLARY BLOOD GLUCOSE          RADIOLOGY & ADDITIONAL TESTS:    Consultant(s):    Care Discussed with Consultants/Other Providers [ ] YES  [ ] NO

## 2020-10-01 NOTE — DISCHARGE NOTE PROVIDER - NSDCACTIVITY_GEN_ALL_CORE
No heavy lifting/straining/Do not make important decisions/Walking - Indoors allowed/Walking - Outdoors allowed/Stairs allowed/Do not drive or operate machinery

## 2020-10-01 NOTE — PHYSICAL THERAPY INITIAL EVALUATION ADULT - PLANNED THERAPY INTERVENTIONS, PT EVAL
balance training/bed mobility training/transfer training/GOAL: Pt will perform 12 stairs with or without U HR as needed within 3-4weeks./gait training

## 2020-10-01 NOTE — H&P ADULT - ASSESSMENT
50M with L ankle closed bimalleolar ankle fracture.    - Admit to Dr. Bronson for ORIF in am.   - Pain control  - NPO/IVF  - CBC/BMP/Coags/UA/T+S x2  - EKG/CXR  - Medical clearance noted in chart  - T wave inversion in anterolateral leads, no symptoms, no chest pain, no hx cardiac dz, active at baseline without symptoms.  - FU trops x3 per medical team.   - Plan for OR for ORIF in am  - CHRIS Bronson

## 2020-10-01 NOTE — PROGRESS NOTE ADULT - ATTENDING COMMENTS
Patient Rudolph nolen  continue current meds  PO as tolerated  activity as tolerated  follow up with ortho  Patient aware of plan

## 2020-10-01 NOTE — PHYSICAL THERAPY INITIAL EVALUATION ADULT - PERTINENT HX OF CURRENT PROBLEM, REHAB EVAL
Pt is a 50y Male presents to [facility] ED s/p [facility] fall playing soccer c/o severe left ankle pain and inability to ambulate. Found to have L ankle fracture now s/p L ankle ORIF.

## 2020-10-01 NOTE — PROGRESS NOTE ADULT - ASSESSMENT
9 y/o male with a h/o HTN, presenting with left ankle fx, which started PTA. Patient states he was playing soccer when he stepped on the ball and everted his ankle. Acute onset, radiating to left lower leg, sharp pain, associated with left ankle deformity. Patient was BIBEMS, no meds given. Denies any head trauma, knee pain, hip pain, or other injuries.  Denies SOB chest pain Nausea or vomiting. Patient seen post op resting comfortably

## 2020-10-04 NOTE — ED PROCEDURE NOTE - PROCEDURE ADDITIONAL DETAILS
L ankle noted to be posterolaterally dislocated.  Preprocedure eval noted diminished DP and overall sensation to foot.  Able to wiggle toes.  No lacerations noted.  Joint reduced c traction/counter traction.  Pt tolerated well.  No complications.  Immediately able to palpate DP and pt reported sensation full.  Foot warm to touch post procedure.  Full motor appreciated.  Splint applied by orthopedic surgery resident.

## 2020-10-04 NOTE — ED PROCEDURE NOTE - CPROC ED INFORMED CONSENT1
L ankle dislocation with vascular compromise/This was an emergent procedure and consent was implied.

## 2021-01-05 NOTE — ED PROVIDER NOTE - CPE EDP ENMT NORM
Fax received from [facility] pharmacy patient perscribed amoxicillin however patient is allergic to penicillin.    Please advise    normal...

## 2022-08-25 NOTE — PRE-OP CHECKLIST - WEIGHT IN KG
92.5 Partial Purse String (Intermediate) Text: Given the location of the defect and the characteristics of the surrounding skin an intermediate purse string closure was deemed most appropriate.  Undermining was performed circumfirentially around the surgical defect.  A purse string suture was then placed and tightened. Wound tension only allowed a partial closure of the circular defect.

## 2022-09-20 NOTE — ED ADULT NURSE NOTE - CAS EDN DISCHARGE INTERVENTIONS
IV intact Dutasteride Counseling: Dustasteride Counseling:  I discussed with the patient the risks of use of dutasteride including but not limited to decreased libido, decreased ejaculate volume, and gynecomastia. Women who can become pregnant should not handle medication.  All of the patient's questions and concerns were addressed. Dutasteride Male Counseling: Dustasteride Counseling:  I discussed with the patient the risks of use of dutasteride including but not limited to decreased libido, decreased ejaculate volume, and gynecomastia. Women who can become pregnant should not handle medication.  All of the patient's questions and concerns were addressed.

## 2023-02-12 NOTE — ED PROVIDER NOTE - RESPIRATORY, MLM
Breath sounds clear and equal bilaterally.

## 2023-05-17 NOTE — ED ADULT TRIAGE NOTE - NS ED NURSE AMBULANCES
[facility] Tranexamic Acid Counseling:  Patient advised of the small risk of bleeding problems with tranexamic acid. They were also instructed to call if they developed any nausea, vomiting or diarrhea. All of the patient's questions and concerns were addressed.